# Patient Record
Sex: FEMALE | Race: WHITE | NOT HISPANIC OR LATINO | URBAN - METROPOLITAN AREA
[De-identification: names, ages, dates, MRNs, and addresses within clinical notes are randomized per-mention and may not be internally consistent; named-entity substitution may affect disease eponyms.]

---

## 2022-06-21 ENCOUNTER — APPOINTMENT (OUTPATIENT)
Dept: URBAN - METROPOLITAN AREA CLINIC 195 | Age: 32
Setting detail: DERMATOLOGY
End: 2022-06-29

## 2022-06-21 VITALS
HEART RATE: 62 BPM | DIASTOLIC BLOOD PRESSURE: 64 MMHG | TEMPERATURE: 97.4 F | HEIGHT: 64 IN | RESPIRATION RATE: 12 BRPM | WEIGHT: 135 LBS | SYSTOLIC BLOOD PRESSURE: 122 MMHG

## 2022-06-21 DIAGNOSIS — L57.8 OTHER SKIN CHANGES DUE TO CHRONIC EXPOSURE TO NONIONIZING RADIATION: ICD-10-CM

## 2022-06-21 DIAGNOSIS — Z12.83 ENCOUNTER FOR SCREENING FOR MALIGNANT NEOPLASM OF SKIN: ICD-10-CM

## 2022-06-21 PROBLEM — C44.329 SQUAMOUS CELL CARCINOMA OF SKIN OF OTHER PARTS OF FACE: Status: ACTIVE | Noted: 2022-06-21

## 2022-06-21 PROCEDURE — OTHER MOHS SURGERY: OTHER

## 2022-06-21 PROCEDURE — 17311 MOHS 1 STAGE H/N/HF/G: CPT

## 2022-06-21 PROCEDURE — 88331 PATH CONSLTJ SURG 1 BLK 1SPC: CPT | Mod: 59

## 2022-06-21 PROCEDURE — 13151 CMPLX RPR E/N/E/L 1.1-2.5 CM: CPT | Mod: 59

## 2022-06-21 PROCEDURE — OTHER MIPS QUALITY: OTHER

## 2022-06-21 PROCEDURE — 11106 INCAL BX SKN SINGLE LES: CPT | Mod: 59

## 2022-06-21 PROCEDURE — OTHER INCISIONAL BIOPSY WITH FROZEN SECTION: OTHER

## 2022-06-21 PROCEDURE — OTHER COUNSELING: OTHER

## 2022-06-21 PROCEDURE — 99203 OFFICE O/P NEW LOW 30 MIN: CPT | Mod: 25

## 2022-06-21 PROCEDURE — OTHER ASC CONSULTATION: OTHER

## 2022-06-21 PROCEDURE — OTHER SURGICAL DECISION MAKING: OTHER

## 2022-06-21 NOTE — PROCEDURE: MOHS SURGERY
Double O-Z Flap Text: Because of the full-thickness nature of the defect and location adjacent to important structure(s), a bilateral rotation flap (O to T) was planned. After prep and anesthesia, arcuate incisions were extended from two opposite side of the wound.  Two flaps were created by undermining in the subcutaneous plane. After hemostasis was obtained, the flaps were advanced and sutured in a layered fashion.

## 2022-06-21 NOTE — HPI: PROCEDURE (MOHS)
Has The Growth Been Previously Biopsied?: has been previously biopsied
Body Location Override (Optional): R upper eyelid

## 2022-06-21 NOTE — PROCEDURE: MOHS SURGERY
Body Location Override (Optional - Billing Will Still Be Based On Selected Body Map Location If Applicable): R upper eyelid

## 2024-03-12 NOTE — PROCEDURE: MOHS SURGERY
(E4) spontaneous Pain Refusal Text: I offered to prescribe pain medication but the patient refused to take this medication.

## 2024-08-02 NOTE — PROCEDURE: INCISIONAL BIOPSY WITH FROZEN SECTION
DISPLAY PLAN FREE TEXT DISPLAY PLAN FREE TEXT Consent: Written consent was obtained and risks were reviewed including but not limited to scarring, infection, bleeding, scabbing, incomplete removal, nerve damage and allergy to anesthesia.